# Patient Record
Sex: FEMALE | Race: WHITE | NOT HISPANIC OR LATINO | Employment: FULL TIME | URBAN - METROPOLITAN AREA
[De-identification: names, ages, dates, MRNs, and addresses within clinical notes are randomized per-mention and may not be internally consistent; named-entity substitution may affect disease eponyms.]

---

## 2018-04-24 ENCOUNTER — NON-PROVIDER VISIT (OUTPATIENT)
Dept: URGENT CARE | Facility: PHYSICIAN GROUP | Age: 33
End: 2018-04-24

## 2018-04-24 DIAGNOSIS — Z11.1 ENCOUNTER FOR PPD TEST: ICD-10-CM

## 2018-04-24 PROCEDURE — 86580 TB INTRADERMAL TEST: CPT | Performed by: PHYSICIAN ASSISTANT

## 2018-04-27 ENCOUNTER — NON-PROVIDER VISIT (OUTPATIENT)
Dept: URGENT CARE | Facility: PHYSICIAN GROUP | Age: 33
End: 2018-04-27

## 2018-04-27 DIAGNOSIS — Z11.1 ENCOUNTER FOR PPD SKIN TEST READING: ICD-10-CM

## 2018-04-27 LAB — TB WHEAL 3D P 5 TU DIAM: NORMAL MM

## 2018-04-27 NOTE — NON-PROVIDER
Zulema Sanchez is a 32 y.o. female here for a non-provider visit for PPD reading -- Step 1 of 1.      1.  Resulted in Epic under enter/edit results? Yes   2.  TB evaluation questionnaire scanned into chart and original given to patient?Yes

## 2019-05-20 ENCOUNTER — OFFICE VISIT (OUTPATIENT)
Dept: URGENT CARE | Facility: PHYSICIAN GROUP | Age: 34
End: 2019-05-20

## 2019-05-20 VITALS
HEIGHT: 63 IN | TEMPERATURE: 98.6 F | RESPIRATION RATE: 16 BRPM | HEART RATE: 74 BPM | BODY MASS INDEX: 20.2 KG/M2 | DIASTOLIC BLOOD PRESSURE: 72 MMHG | SYSTOLIC BLOOD PRESSURE: 116 MMHG | OXYGEN SATURATION: 98 % | WEIGHT: 114 LBS

## 2019-05-20 DIAGNOSIS — L30.9 DERMATITIS: ICD-10-CM

## 2019-05-20 PROCEDURE — 99203 OFFICE O/P NEW LOW 30 MIN: CPT | Performed by: PHYSICIAN ASSISTANT

## 2019-05-20 RX ORDER — DOXYCYCLINE HYCLATE 100 MG
100 TABLET ORAL 2 TIMES DAILY
Qty: 14 TAB | Refills: 0 | Status: SHIPPED | OUTPATIENT
Start: 2019-05-20 | End: 2019-05-27

## 2019-05-20 RX ORDER — LIDOCAINE 50 MG/G
OINTMENT TOPICAL
Qty: 60 G | Refills: 0 | Status: SHIPPED | OUTPATIENT
Start: 2019-05-20

## 2019-05-20 RX ORDER — PREDNISONE 20 MG/1
TABLET ORAL
Qty: 12 TAB | Refills: 0 | Status: SHIPPED | OUTPATIENT
Start: 2019-05-20

## 2019-05-20 ASSESSMENT — ENCOUNTER SYMPTOMS
CARDIOVASCULAR NEGATIVE: 1
CONSTITUTIONAL NEGATIVE: 1
GASTROINTESTINAL NEGATIVE: 1
RESPIRATORY NEGATIVE: 1
MUSCULOSKELETAL NEGATIVE: 1
NEUROLOGICAL NEGATIVE: 1

## 2019-05-20 NOTE — PROGRESS NOTES
Subjective:      Zulema Sanchez is a 33 y.o. female who presents with Rash (hot, itchy, burns, genital as well on arms )        Rash     Patient presents today for about 1 week of worsening rash.  She states it started in the groin and she attributed to having shaved to close in the bikini and perineum area.  She notes it started with itching and burning and the rash has spread all around the groin and is not around (but not in) bilateral axillae and as of today has spread to her left upper arm.   She notes it itches but also burns and stings.  She thought it was possibly razor burn but when it kept spreading she became concerned. She did change razors and notes allergy to latex products but did not feel this would be an issue with a razor. She has not noticed any blisters or pus heads to the bumps. She denies any concern for STD.  No one else at home has the rash.  No fevers, chills.      Review of Systems   Constitutional: Negative.    HENT: Negative.    Respiratory: Negative.    Cardiovascular: Negative.    Gastrointestinal: Negative.    Genitourinary: Negative.    Musculoskeletal: Negative.    Skin: Positive for itching and rash.   Neurological: Negative.        PMH:  has a past medical history of   (10/5/2009) and Unspecified hemorrhagic conditions. She also has no past medical history of ASTHMA; CAD (coronary artery disease); Cancer (HCC); Congestive heart failure (HCC); COPD; Diabetes; Hypertension; Liver disease; Psychiatric disorder; Renal disorder; Seizure disorder (HCC); or Stroke (HCC).  MEDS:   Current Outpatient Prescriptions:   •  doxycycline (VIBRAMYCIN) 100 MG Tab, Take 1 Tab by mouth 2 times a day for 7 days., Disp: 14 Tab, Rfl: 0  •  predniSONE (DELTASONE) 20 MG Tab, 2 tablets x 4 days, 1 tablet x 4 days then off., Disp: 12 Tab, Rfl: 0  •  lidocaine (XYLOCAINE) 5 % Ointment, Apply thin layer to affected areas TID-QID prn, Disp: 60 g, Rfl: 0  ALLERGIES:   Allergies   Allergen Reactions   •  "Heparin      HIT   • Ciprofloxacin    • Dilaudid [Hydromorphone Hcl]      Severe nausea and vomiting   • Vancomycin    • Latex Hives     No reaction to latex this last admission   • Vitamin B Complex [B Complex] Vomiting     SURGHX:   Past Surgical History:   Procedure Laterality Date   • EXPLORATORY LAPAROTOMY  1/15/2010    Performed by ANGEL SWIFT at [facility]   • BOWEL RESECTION  1/15/2010    Performed by ANGEL SWIFT at [facility]   • SKIN ABSCESS INCISION AND DRAINAGE  12/18/2009    Performed by ANGEL SWIFT at [facility]   • EXPLORATORY LAPAROTOMY  10/16/2009    Performed by ANGEL SWIFT at [facility]   • BOWEL CLOSURE  10/16/2009    Performed by ANGEL SWIFT at [facility]   • LAPAROSCOPY  10/15/2009    Performed by ELISHA ANGULO at [facility]   • LAPAROSCOPY  10/4/2009    Performed by TEREZA POLLOCK at [facility]   • OTHER ORTHOPEDIC SURGERY      lft hand bome fragment removed     SOCHX:  reports that she has never smoked. She has never used smokeless tobacco. She reports that she does not drink alcohol or use drugs.  FH: Family history was reviewed, no pertinent findings to report   Objective:     /72   Pulse 74   Temp 37 °C (98.6 °F)   Resp 16   Ht 1.6 m (5' 3\")   Wt 51.7 kg (114 lb)   SpO2 98%   BMI 20.19 kg/m²      Physical Exam   Constitutional: She is oriented to person, place, and time. She appears well-developed and well-nourished. No distress.   HENT:   Head: Normocephalic and atraumatic.   Right Ear: External ear normal.   Left Ear: External ear normal.   Eyes: Conjunctivae and EOM are normal.   Neck: Normal range of motion. Neck supple.   Cardiovascular: Normal rate, regular rhythm and normal heart sounds.    Pulmonary/Chest: Effort normal and breath sounds normal.   Musculoskeletal: Normal range of motion.   Neurological: She is alert and oriented to person, place, " and time.   Skin: Skin is warm and dry. Rash noted.        Psychiatric: She has a normal mood and affect. Her behavior is normal.   Vitals reviewed.              Assessment/Plan:     1. Dermatitis    - doxycycline (VIBRAMYCIN) 100 MG Tab; Take 1 Tab by mouth 2 times a day for 7 days.  Dispense: 14 Tab; Refill: 0  - predniSONE (DELTASONE) 20 MG Tab; 2 tablets x 4 days, 1 tablet x 4 days then off.  Dispense: 12 Tab; Refill: 0  - lidocaine (XYLOCAINE) 5 % Ointment; Apply thin layer to affected areas TID-QID prn  Dispense: 60 g; Refill: 0    -question of initial razor burn however spreading with increased concern for contact dermatitis/allergic dermatitis.  Will cover as above and patient will plan on follow up with her PCP this week to assess for improvement.   RTC precautions in the meantime.       Supportive care, differential diagnoses, and indications for immediate follow-up discussed with patient.   Pathogenesis of diagnosis discussed including typical length and natural progression.   Instructed to return to clinic or nearest emergency department for any change in condition, further concerns, or worsening of symptoms.  Patient states understanding of the plan of care and discharge instructions.        Pamela Acosta P.A.-C.

## 2022-07-25 ENCOUNTER — TELEPHONE (OUTPATIENT)
Dept: OBGYN | Facility: CLINIC | Age: 37
End: 2022-07-25
Payer: COMMERCIAL

## 2022-07-25 NOTE — TELEPHONE ENCOUNTER
Zulema was calling today to try to establish care as she has not had a gynecologist since her old OB/GYN retired about 5 years ago. Attempted to get patient to scheduling and patient hung up. Called patient and let her know that unfortunately we are not taking new patients at the moment. Patient understood and had no further questions or concerns.

## 2024-06-21 ENCOUNTER — NON-PROVIDER VISIT (OUTPATIENT)
Dept: URGENT CARE | Facility: PHYSICIAN GROUP | Age: 39
End: 2024-06-21

## 2024-06-21 DIAGNOSIS — T50.905A SYSTEMIC REACTION TO SKIN TEST, INITIAL ENCOUNTER: Primary | ICD-10-CM

## 2024-06-21 PROCEDURE — 86580 TB INTRADERMAL TEST: CPT | Performed by: NURSE PRACTITIONER

## 2024-06-21 NOTE — PROGRESS NOTES
Zulema Sanchez is a 39 y.o. female here for a non-provider visit for PPD placement -- Step 1 of 1    Reason for PPD:   foster care requirement    1. TB evaluation questionnaire completed by patient? YES (  2.  Patient notified to return to clinic for reading on: 06/21/2024  3.  PPD Placement documentation completed on TB evaluation questionnaire? YES  4.  Location of TB evaluation questionnaire filed: urgent care

## 2024-06-23 ENCOUNTER — NON-PROVIDER VISIT (OUTPATIENT)
Dept: URGENT CARE | Facility: PHYSICIAN GROUP | Age: 39
End: 2024-06-23

## 2024-06-23 DIAGNOSIS — Z11.1 ENCOUNTER FOR PPD SKIN TEST READING: Primary | ICD-10-CM

## 2024-06-23 LAB — TB WHEAL 3D P 5 TU DIAM: 0 MM

## 2024-06-23 PROCEDURE — 99999 PR NO CHARGE: CPT

## 2024-06-23 NOTE — PROGRESS NOTES
Zulema Sanchez is a 39 y.o. female here for a non-provider visit for PPD reading -- Step 1 of 1.      1.  Resulted in Epic under enter/edit results? Yes   2.  TB evaluation questionnaire scanned into chart and original given to patient?Yes      3. Was induration greater than 0 mm? No.        Routed to PCP? Yes